# Patient Record
Sex: FEMALE | Race: WHITE | NOT HISPANIC OR LATINO
[De-identification: names, ages, dates, MRNs, and addresses within clinical notes are randomized per-mention and may not be internally consistent; named-entity substitution may affect disease eponyms.]

---

## 2017-01-10 ENCOUNTER — APPOINTMENT (OUTPATIENT)
Dept: DERMATOLOGY | Facility: CLINIC | Age: 42
End: 2017-01-10

## 2017-01-13 ENCOUNTER — APPOINTMENT (OUTPATIENT)
Dept: DERMATOLOGY | Facility: CLINIC | Age: 42
End: 2017-01-13

## 2017-06-22 ENCOUNTER — APPOINTMENT (OUTPATIENT)
Dept: DERMATOLOGY | Facility: CLINIC | Age: 42
End: 2017-06-22

## 2017-12-19 ENCOUNTER — APPOINTMENT (OUTPATIENT)
Dept: DERMATOLOGY | Facility: CLINIC | Age: 42
End: 2017-12-19

## 2018-06-21 ENCOUNTER — APPOINTMENT (OUTPATIENT)
Dept: DERMATOLOGY | Facility: CLINIC | Age: 43
End: 2018-06-21
Payer: COMMERCIAL

## 2018-06-21 PROCEDURE — 99213 OFFICE O/P EST LOW 20 MIN: CPT

## 2020-06-30 ENCOUNTER — APPOINTMENT (OUTPATIENT)
Dept: MAMMOGRAPHY | Facility: CLINIC | Age: 45
End: 2020-06-30
Payer: COMMERCIAL

## 2020-06-30 PROCEDURE — 77067 SCR MAMMO BI INCL CAD: CPT

## 2020-06-30 PROCEDURE — 77063 BREAST TOMOSYNTHESIS BI: CPT

## 2022-08-15 ENCOUNTER — APPOINTMENT (OUTPATIENT)
Dept: RHEUMATOLOGY | Facility: CLINIC | Age: 47
End: 2022-08-15

## 2022-08-15 ENCOUNTER — NON-APPOINTMENT (OUTPATIENT)
Age: 47
End: 2022-08-15

## 2022-08-15 ENCOUNTER — RESULT REVIEW (OUTPATIENT)
Age: 47
End: 2022-08-15

## 2022-08-15 VITALS
OXYGEN SATURATION: 97 % | SYSTOLIC BLOOD PRESSURE: 118 MMHG | BODY MASS INDEX: 29.07 KG/M2 | HEIGHT: 61 IN | DIASTOLIC BLOOD PRESSURE: 72 MMHG | WEIGHT: 154 LBS | TEMPERATURE: 97.6 F | HEART RATE: 77 BPM

## 2022-08-15 DIAGNOSIS — Z78.9 OTHER SPECIFIED HEALTH STATUS: ICD-10-CM

## 2022-08-15 DIAGNOSIS — Z87.891 PERSONAL HISTORY OF NICOTINE DEPENDENCE: ICD-10-CM

## 2022-08-15 DIAGNOSIS — Z82.69 FAMILY HISTORY OF OTHER DISEASES OF THE MUSCULOSKELETAL SYSTEM AND CONNECTIVE TISSUE: ICD-10-CM

## 2022-08-15 PROCEDURE — 99204 OFFICE O/P NEW MOD 45 MIN: CPT

## 2022-08-15 RX ORDER — PHENTERMINE HYDROCHLORIDE 37.5 MG/1
37.5 CAPSULE ORAL
Refills: 0 | Status: DISCONTINUED | COMMUNITY

## 2022-08-15 RX ORDER — TOPIRAMATE 50 MG/1
50 TABLET, COATED ORAL
Refills: 0 | Status: DISCONTINUED | COMMUNITY

## 2022-08-15 NOTE — PHYSICAL EXAM
[General Appearance - Alert] : alert [General Appearance - In No Acute Distress] : in no acute distress [General Appearance - Well Nourished] : well nourished [Sclera] : the sclera and conjunctiva were normal [PERRL With Normal Accommodation] : pupils were equal in size, round, and reactive to light [Extraocular Movements] : extraocular movements were intact [Outer Ear] : the ears and nose were normal in appearance [Nasal Cavity] : the nasal mucosa and septum were normal [Oropharynx] : the oropharynx was normal [Neck Appearance] : the appearance of the neck was normal [Auscultation Breath Sounds / Voice Sounds] : lungs were clear to auscultation bilaterally [Heart Sounds] : normal S1 and S2 [Heart Rate And Rhythm] : heart rate was normal and rhythm regular [Murmurs] : no murmurs [Edema] : there was no peripheral edema [Bowel Sounds] : normal bowel sounds [Abdomen Soft] : soft [Abdomen Tenderness] : non-tender [No CVA Tenderness] : no ~M costovertebral angle tenderness [No Spinal Tenderness] : no spinal tenderness [Abnormal Walk] : normal gait [Nail Clubbing] : no clubbing  or cyanosis of the fingernails [Musculoskeletal - Swelling] : no joint swelling seen [Motor Tone] : muscle strength and tone were normal [FreeTextEntry1] : No synovitis, mild crepitus in L knee tho ROM intact  [] : no rash [No Focal Deficits] : no focal deficits [Oriented To Time, Place, And Person] : oriented to person, place, and time [Impaired Insight] : insight and judgment were intact [Affect] : the affect was normal

## 2022-08-15 NOTE — HISTORY OF PRESENT ILLNESS
[FreeTextEntry1] : IMANI WU is a 47 year old woman who presents with + YAMIL 1:160 (H) checked in response to chronic rashes on cheeks, periorally with lip swelling, and more recently on ears/ neck x 1 year. Raised, painful, lasts up to 1-2 weeks, worsened with sun exposure, no other noticeable triggers, did start meds for weight loss but this was in 2020 and rash did not occur til 2021. When rash occurs, develops arthralgias in L knee and b/l hands without faustina synovitis, no other affected joints. Has to ice them, improves with Aleve. Rashes occur once monthly, joints are occurring sometimes in isolation. No symptoms today. \par \par + markedly hair thinning diffusely but no focal loss \par + some sun sensitivity \par + chronic fatigue but not worsening \par \par SLE ROS negative for alopecia, sicca, salivary gland swelling, oral ulcers, SOB, chest pain, serositis, abd pain, dysuria, hematuria, hematologic abnormalities, Raynauds. APLS ROS - 2 uncomplicated pregnancies, no miscarriage, no thrombotic events. \par \par Labs - as above \par Negative ESR/CRP, RF\par + FH of father with ? lupus, Graves -- no longer in contact with him

## 2022-08-15 NOTE — ASSESSMENT
[FreeTextEntry1] : IMANI WU is a 47 year old woman who presents with below -- \par \par # + YAMIL, rashes tho not in classic lupus pattern, and associated fixed arthralgias - ddx remains broad but certainly lupus is on it. \par - Discussed that YAMIL can be seen in 10-15% of normal population, + Ab incidence increases with age and with presence of other family members with hx of autoimmune dz or Ab positivity. Also discussed that YAMIL alone does not confer a diagnosis and that presently she does not meet full criteria for SLE however w/u is warranted given her sx\par - serologies as below\par - XR L knee \par - photo protective measures reviewed -- Importance of limiting exposure to sun, photo protective clothing, and use of high SPF sunscreen \par - reviewed other CTD sx, will monitor for any \par - if above negative, will likely refer to derm and see if ?allergic given she is actively getting allergy shots\par \par TEB in 2-3 weeks to review results

## 2022-08-16 ENCOUNTER — TRANSCRIPTION ENCOUNTER (OUTPATIENT)
Age: 47
End: 2022-08-16

## 2022-08-16 ENCOUNTER — APPOINTMENT (OUTPATIENT)
Dept: RADIOLOGY | Facility: CLINIC | Age: 47
End: 2022-08-16

## 2022-08-16 PROCEDURE — 73560 X-RAY EXAM OF KNEE 1 OR 2: CPT | Mod: LT

## 2022-08-17 LAB
ALBUMIN SERPL ELPH-MCNC: 4.2 G/DL
ALP BLD-CCNC: 67 U/L
ALT SERPL-CCNC: 8 U/L
ANION GAP SERPL CALC-SCNC: 12 MMOL/L
APPEARANCE: CLEAR
AST SERPL-CCNC: 13 U/L
BACTERIA: NEGATIVE
BASOPHILS # BLD AUTO: 0.07 K/UL
BASOPHILS NFR BLD AUTO: 1.1 %
BILIRUB SERPL-MCNC: 0.4 MG/DL
BILIRUBIN URINE: NEGATIVE
BLOOD URINE: ABNORMAL
BUN SERPL-MCNC: 12 MG/DL
C3 SERPL-MCNC: 114 MG/DL
C4 SERPL-MCNC: 24 MG/DL
CALCIUM SERPL-MCNC: 9.4 MG/DL
CCP AB SER IA-ACNC: <8 UNITS
CHLORIDE SERPL-SCNC: 100 MMOL/L
CO2 SERPL-SCNC: 25 MMOL/L
COLOR: COLORLESS
CREAT SERPL-MCNC: 0.81 MG/DL
CRP SERPL-MCNC: <3 MG/L
DSDNA AB SER-ACNC: <12 IU/ML
EGFR: 91 ML/MIN/1.73M2
EOSINOPHIL # BLD AUTO: 0.49 K/UL
EOSINOPHIL NFR BLD AUTO: 7.5 %
ERYTHROCYTE [SEDIMENTATION RATE] IN BLOOD BY WESTERGREN METHOD: 5 MM/HR
GLUCOSE QUALITATIVE U: NEGATIVE
GLUCOSE SERPL-MCNC: 73 MG/DL
HCT VFR BLD CALC: 40.9 %
HGB BLD-MCNC: 12.6 G/DL
HYALINE CASTS: 0 /LPF
IMM GRANULOCYTES NFR BLD AUTO: 0.5 %
KETONES URINE: NEGATIVE
LEUKOCYTE ESTERASE URINE: NEGATIVE
LYMPHOCYTES # BLD AUTO: 1.85 K/UL
LYMPHOCYTES NFR BLD AUTO: 28.5 %
MAN DIFF?: NORMAL
MCHC RBC-ENTMCNC: 28.7 PG
MCHC RBC-ENTMCNC: 30.8 GM/DL
MCV RBC AUTO: 93.2 FL
MICROSCOPIC-UA: NORMAL
MONOCYTES # BLD AUTO: 0.35 K/UL
MONOCYTES NFR BLD AUTO: 5.4 %
NEUTROPHILS # BLD AUTO: 3.71 K/UL
NEUTROPHILS NFR BLD AUTO: 57 %
NITRITE URINE: NEGATIVE
PH URINE: 7.5
PLATELET # BLD AUTO: 285 K/UL
POTASSIUM SERPL-SCNC: 4.3 MMOL/L
PROT SERPL-MCNC: 6.2 G/DL
PROTEIN URINE: NEGATIVE
RBC # BLD: 4.39 M/UL
RBC # FLD: 13.8 %
RED BLOOD CELLS URINE: 1 /HPF
RF+CCP IGG SER-IMP: NEGATIVE
SODIUM SERPL-SCNC: 137 MMOL/L
SPECIFIC GRAVITY URINE: 1
SQUAMOUS EPITHELIAL CELLS: 0 /HPF
THYROGLOB AB SERPL-ACNC: <20 IU/ML
THYROPEROXIDASE AB SERPL IA-ACNC: <10 IU/ML
UROBILINOGEN URINE: NORMAL
WBC # FLD AUTO: 6.5 K/UL
WHITE BLOOD CELLS URINE: 0 /HPF

## 2022-08-21 LAB
ACE BLD-CCNC: 54 U/L
ENA RNP AB SER IA-ACNC: 0.2 AL
ENA SM AB SER IA-ACNC: <0.2 AL
ENA SS-A AB SER IA-ACNC: <0.2 AL
ENA SS-B AB SER IA-ACNC: <0.2 AL
HISTONE AB SER QL: 0.4 UNITS

## 2022-08-30 ENCOUNTER — APPOINTMENT (OUTPATIENT)
Dept: RHEUMATOLOGY | Facility: CLINIC | Age: 47
End: 2022-08-30

## 2022-08-30 LAB
CHRONIC URTICARIA PANEL (CU INDEX): <5.8
G6PD SER-CCNC: 16.1 U/G HGB

## 2022-08-30 PROCEDURE — 99442: CPT

## 2022-08-30 NOTE — PHYSICAL EXAM
[General Appearance - Alert] : alert [General Appearance - In No Acute Distress] : in no acute distress [General Appearance - Well Nourished] : well nourished [Sclera] : the sclera and conjunctiva were normal [PERRL With Normal Accommodation] : pupils were equal in size, round, and reactive to light [Extraocular Movements] : extraocular movements were intact [Neck Appearance] : the appearance of the neck was normal [Auscultation Breath Sounds / Voice Sounds] : lungs were clear to auscultation bilaterally [Abnormal Walk] : normal gait [Nail Clubbing] : no clubbing  or cyanosis of the fingernails [Musculoskeletal - Swelling] : no joint swelling seen [Motor Tone] : muscle strength and tone were normal [] : no rash [Oriented To Time, Place, And Person] : oriented to person, place, and time [Impaired Insight] : insight and judgment were intact [Affect] : the affect was normal [FreeTextEntry1] : No synovitis, mild crepitus in L knee tho ROM intact

## 2022-09-09 NOTE — REASON FOR VISIT
[Patient] : the patient [Self] : self [Follow-Up: _____] : a [unfilled] follow-up visit [This encounter was initiated by telehealth (audio with video) and converted to telephone (audio only) due to technical difficulties.] : This encounter was initiated by telehealth (audio with video) and converted to telephone (audio only) due to technical difficulties. [Home] : at home, [unfilled] , at the time of the visit. [Medical Office: ([facility])___] : at the medical office located in  [Verbal consent obtained from patient] : the patient, [unfilled] [FreeTextEntry1] : + YAMIL, rash, joint pain

## 2022-09-09 NOTE — ASSESSMENT
[FreeTextEntry1] : IMANI WU is a 47 year old woman who presents with below -- \par \par # + YAMIL, rashes tho not in classic lupus pattern, and associated fixed arthralgias - serologic w/u negative to date including specific lupus serologies and sx have not worsened in interim. \par - Discussed that YAMIL can be seen in 10-15% of normal population, + Ab incidence increases with age and with presence of other family members with hx of autoimmune dz or Ab positivity. Also discussed that YAMIL alone does not confer a diagnosis and that presently she does not meet full criteria for SLE \par - labs and imaging reviewed with her today -- at present no overt rheum dx but there is a possibility for mild lupus with + YAMIL only vs a subacute cutaneous process with ?joints unrelated - recommend clinical surveillance for that. Advised prompt derm eval if rashes recur for possible bx as this will help with diagnosis. \par - can resume med for weight loss she has been holding, will assess to see if adding it back worsens symptoms or not. \par - c/w photo protective measure -- limiting exposure to sun, photo protective clothing, and use of high SPF sunscreen \par - reviewed other CTD sx, will monitor for any \par \par RTC in 6 months to re-eval, sooner if new/worsening sx \par \par Total time on telephone : 13 minutes

## 2022-09-09 NOTE — REVIEW OF SYSTEMS
[Feeling Tired] : feeling tired [As Noted in HPI] : as noted in HPI [Arthralgias] : arthralgias [Joint Pain] : joint pain [Negative] : Integumentary [Joint Swelling] : no joint swelling [Joint Stiffness] : no joint stiffness

## 2022-09-09 NOTE — HISTORY OF PRESENT ILLNESS
[FreeTextEntry1] : IMANI WU is a 47 year old woman who presents with + YAMIL 1:160 (H) checked in response to chronic rashes on cheeks, periorally with lip swelling, and more recently on ears/ neck x 1 year. Raised, painful, lasts up to 1-2 weeks, worsened with sun exposure, no other noticeable triggers, did start meds for weight loss but this was in 2020 and rash did not occur til 2021. When rash occurs, develops arthralgias in L knee and b/l hands without faustina synovitis, no other affected joints. Has to ice them, improves with Aleve. Rashes occur once monthly, joints are occurring sometimes in isolation. No symptoms today. \par \par + markedly hair thinning diffusely but no focal loss \par + some sun sensitivity \par + chronic fatigue but not worsening \par \par SLE ROS negative for alopecia, sicca, salivary gland swelling, oral ulcers, SOB, chest pain, serositis, abd pain, dysuria, hematuria, hematologic abnormalities, Raynauds. APLS ROS - 2 uncomplicated pregnancies, no miscarriage, no thrombotic events. \par \par Labs - as above \par Negative ESR/CRP, RF/CCP, ACE, thyroid Abs, lupus specifics, C3/4, CU index \par + FH of father with ? lupus, Graves -- no longer in contact with him \par \par ----------\par 8/30/22 -- No further rashes or worsening arthralgias since last visit, had 1 episode of knee pain, resolved after 4 hours s/p Aleve. Fatigue stable. No other CTD sx in interim. Feeling well today.

## 2023-02-27 ENCOUNTER — APPOINTMENT (OUTPATIENT)
Dept: RHEUMATOLOGY | Facility: CLINIC | Age: 48
End: 2023-02-27

## 2023-04-17 ENCOUNTER — APPOINTMENT (OUTPATIENT)
Dept: RHEUMATOLOGY | Facility: CLINIC | Age: 48
End: 2023-04-17
Payer: COMMERCIAL

## 2023-04-17 VITALS
OXYGEN SATURATION: 100 % | HEIGHT: 61 IN | SYSTOLIC BLOOD PRESSURE: 95 MMHG | BODY MASS INDEX: 26.24 KG/M2 | TEMPERATURE: 97.7 F | DIASTOLIC BLOOD PRESSURE: 66 MMHG | WEIGHT: 139 LBS | HEART RATE: 88 BPM

## 2023-04-17 DIAGNOSIS — R21 RASH AND OTHER NONSPECIFIC SKIN ERUPTION: ICD-10-CM

## 2023-04-17 PROCEDURE — 99214 OFFICE O/P EST MOD 30 MIN: CPT

## 2023-04-17 RX ORDER — TRIAMCINOLONE ACETONIDE 1 MG/G
0.1 CREAM TOPICAL
Qty: 15 | Refills: 0 | Status: DISCONTINUED | COMMUNITY
Start: 2022-03-28 | End: 2023-04-17

## 2023-04-17 RX ORDER — PHENTERMINE HYDROCHLORIDE 37.5 MG/1
37.5 TABLET ORAL
Qty: 30 | Refills: 0 | Status: DISCONTINUED | COMMUNITY
Start: 2022-06-15 | End: 2023-04-17

## 2023-04-17 RX ORDER — TIRZEPATIDE 5 MG/.5ML
5 INJECTION, SOLUTION SUBCUTANEOUS
Refills: 0 | Status: ACTIVE | COMMUNITY

## 2023-04-17 RX ORDER — TOPIRAMATE 50 MG/1
50 TABLET, FILM COATED ORAL
Qty: 90 | Refills: 0 | Status: DISCONTINUED | COMMUNITY
Start: 2022-06-15 | End: 2023-04-17

## 2023-06-21 ENCOUNTER — APPOINTMENT (OUTPATIENT)
Dept: ULTRASOUND IMAGING | Facility: CLINIC | Age: 48
End: 2023-06-21
Payer: COMMERCIAL

## 2023-06-21 ENCOUNTER — APPOINTMENT (OUTPATIENT)
Dept: MAMMOGRAPHY | Facility: CLINIC | Age: 48
End: 2023-06-21
Payer: COMMERCIAL

## 2023-06-21 PROCEDURE — 77067 SCR MAMMO BI INCL CAD: CPT

## 2023-06-21 PROCEDURE — 76536 US EXAM OF HEAD AND NECK: CPT

## 2023-06-21 PROCEDURE — 77063 BREAST TOMOSYNTHESIS BI: CPT

## 2023-07-05 NOTE — ASSESSMENT
[FreeTextEntry1] : IMANI WU is a 48 year old woman with below -- \par \par # + YAMIL, rashes tho not in classic lupus pattern, and associated fixed arthralgias - serologic w/u negative to date including specific lupus serologies and sx have not worsened in interim. \par - Discussed that YAMIL can be seen in 10-15% of normal population, + Ab incidence increases with age and with presence of other family members with hx of autoimmune dz or Ab positivity. Also discussed that YAMIL alone does not confer a diagnosis and that presently she does not meet full criteria for SLE \par - labs and imaging reviewed with her today -- at present no overt rheum dx but there is a possibility for mild lupus with + YAMIL only vs a subacute cutaneous process with ?joints unrelated - c/w clinical surveillance, will repeat labs, prompt derm eval if rashes recur for possible bx as this will help with diagnosis.  \par - c/w photo protective measure -- limiting exposure to sun, photo protective clothing, and use of high SPF sunscreen \par - reviewed other CTD sx, will monitor for any \par \par RTC in 4 months to re-eval, sooner if new/worsening sx

## 2023-07-05 NOTE — HISTORY OF PRESENT ILLNESS
[FreeTextEntry1] : IMANI WU is a 47 year old woman who presents with + YAMIL 1:160 (H) checked in response to chronic rashes on cheeks, periorally with lip swelling, and more recently on ears/ neck x 1 year. Raised, painful, lasts up to 1-2 weeks, worsened with sun exposure, no other noticeable triggers, did start meds for weight loss but this was in 2020 and rash did not occur til 2021. When rash occurs, develops arthralgias in L knee and b/l hands without faustina synovitis, no other affected joints. Has to ice them, improves with Aleve. Rashes occur once monthly, joints are occurring sometimes in isolation. No symptoms today. \par \par + markedly hair thinning diffusely but no focal loss \par + some sun sensitivity \par + chronic fatigue but not worsening \par \par SLE ROS negative for alopecia, sicca, salivary gland swelling, oral ulcers, SOB, chest pain, serositis, abd pain, dysuria, hematuria, hematologic abnormalities, Raynauds. APLS ROS - 2 uncomplicated pregnancies, no miscarriage, no thrombotic events. \par \par Labs - as above \par Negative ESR/CRP, RF/CCP, ACE, thyroid Abs, lupus specifics, C3/4, CU index \par + FH of father with ? lupus, Graves -- no longer in contact with him \par \par ----------\par 8/30/22 -- No further rashes or worsening arthralgias since last visit, had 1 episode of knee pain, resolved after 4 hours s/p Aleve. Fatigue stable. No other CTD sx in interim. Feeling well today. \par \par 4/17/23 -- Few minor episodes of rash but markedly less severe than prior, no current joint inflammatory sx. Not needing Aleve. Fatigue and mild GI sx chronic and stable. Some episodes of feeling like throat is closing up but not with food, moreso when speaking, improved with drinking water. Remainder of CTD ROS negative.

## 2023-07-05 NOTE — REVIEW OF SYSTEMS
[Feeling Tired] : feeling tired [Arthralgias] : arthralgias [Negative] : Heme/Lymph [Abdominal Pain] : abdominal pain [Joint Swelling] : no joint swelling [Joint Stiffness] : no joint stiffness [As Noted in HPI] : as noted in HPI

## 2023-07-05 NOTE — PHYSICAL EXAM
[General Appearance - Alert] : alert [General Appearance - In No Acute Distress] : in no acute distress [General Appearance - Well Nourished] : well nourished [Sclera] : the sclera and conjunctiva were normal [PERRL With Normal Accommodation] : pupils were equal in size, round, and reactive to light [Extraocular Movements] : extraocular movements were intact [Outer Ear] : the ears and nose were normal in appearance [Nasal Cavity] : the nasal mucosa and septum were normal [Oropharynx] : the oropharynx was normal [Neck Appearance] : the appearance of the neck was normal [Auscultation Breath Sounds / Voice Sounds] : lungs were clear to auscultation bilaterally [Heart Rate And Rhythm] : heart rate was normal and rhythm regular [Heart Sounds] : normal S1 and S2 [Murmurs] : no murmurs [Edema] : there was no peripheral edema [Bowel Sounds] : normal bowel sounds [Abdomen Soft] : soft [Abdomen Tenderness] : non-tender [No CVA Tenderness] : no ~M costovertebral angle tenderness [No Spinal Tenderness] : no spinal tenderness [Abnormal Walk] : normal gait [Nail Clubbing] : no clubbing  or cyanosis of the fingernails [Musculoskeletal - Swelling] : no joint swelling seen [Motor Tone] : muscle strength and tone were normal [] : no rash [No Focal Deficits] : no focal deficits [Oriented To Time, Place, And Person] : oriented to person, place, and time [Impaired Insight] : insight and judgment were intact [Affect] : the affect was normal [Neck Cervical Mass (___cm)] : no neck mass was observed [FreeTextEntry1] : No synovitis, mild crepitus in L knee tho ROM intact

## 2023-08-14 ENCOUNTER — APPOINTMENT (OUTPATIENT)
Dept: RHEUMATOLOGY | Facility: CLINIC | Age: 48
End: 2023-08-14
Payer: COMMERCIAL

## 2023-08-14 VITALS
OXYGEN SATURATION: 98 % | HEART RATE: 75 BPM | WEIGHT: 132 LBS | DIASTOLIC BLOOD PRESSURE: 85 MMHG | SYSTOLIC BLOOD PRESSURE: 121 MMHG | TEMPERATURE: 97.3 F | HEIGHT: 61 IN | BODY MASS INDEX: 24.92 KG/M2

## 2023-08-14 DIAGNOSIS — M62.838 OTHER MUSCLE SPASM: ICD-10-CM

## 2023-08-14 DIAGNOSIS — R76.8 OTHER SPECIFIED ABNORMAL IMMUNOLOGICAL FINDINGS IN SERUM: ICD-10-CM

## 2023-08-14 DIAGNOSIS — M25.50 PAIN IN UNSPECIFIED JOINT: ICD-10-CM

## 2023-08-14 PROCEDURE — 99214 OFFICE O/P EST MOD 30 MIN: CPT

## 2023-08-14 NOTE — PHYSICAL EXAM
[General Appearance - In No Acute Distress] : in no acute distress [General Appearance - Alert] : alert [General Appearance - Well Nourished] : well nourished [Sclera] : the sclera and conjunctiva were normal [PERRL With Normal Accommodation] : pupils were equal in size, round, and reactive to light [Extraocular Movements] : extraocular movements were intact [Outer Ear] : the ears and nose were normal in appearance [Nasal Cavity] : the nasal mucosa and septum were normal [Oropharynx] : the oropharynx was normal [Neck Appearance] : the appearance of the neck was normal [Neck Cervical Mass (___cm)] : no neck mass was observed [Auscultation Breath Sounds / Voice Sounds] : lungs were clear to auscultation bilaterally [Heart Rate And Rhythm] : heart rate was normal and rhythm regular [Heart Sounds] : normal S1 and S2 [Murmurs] : no murmurs [Edema] : there was no peripheral edema [No CVA Tenderness] : no ~M costovertebral angle tenderness [No Spinal Tenderness] : no spinal tenderness [Abnormal Walk] : normal gait [Nail Clubbing] : no clubbing  or cyanosis of the fingernails [Musculoskeletal - Swelling] : no joint swelling seen [Motor Tone] : muscle strength and tone were normal [] : no rash [No Focal Deficits] : no focal deficits [Oriented To Time, Place, And Person] : oriented to person, place, and time [Impaired Insight] : insight and judgment were intact [Affect] : the affect was normal [FreeTextEntry1] : No synovitis, mild crepitus in L knee tho ROM intact, L shoulder ROM intact, + mild trap spasm

## 2023-08-14 NOTE — ASSESSMENT
[FreeTextEntry1] : IMANI WU is a 48 year old woman with below --   # + YAMIL, rashes tho not in classic lupus pattern, and associated fixed arthralgias - serologic w/u negative to date including specific lupus serologies, repeat labs without lupus activity, initial sx have resolved.  - Discussed that YAMIL can be seen in 10-15% of normal population, + Ab incidence increases with age and with presence of other family members with hx of autoimmune dz or Ab positivity. Also discussed that YAMIL alone does not confer a diagnosis and that presently she does not meet full criteria for SLE  - labs and imaging reviewed with her today -- remain negative -- no further w/u at present, reviewed CTD sx to monitor for and return promptly if develops any, prompt derm eval if rashes recur  - c/w photo protective measure -- limiting exposure to sun, photo protective clothing, and use of high SPF sunscreen   # L sided trap spasm - advised stretching as minimally affecting her, reviewed warning signs of worsening LUE paresthesia/weakness and recommended prompt PMD eval if these develop   RTC prn

## 2023-08-14 NOTE — REVIEW OF SYSTEMS
[As Noted in HPI] : as noted in HPI [Arthralgias] : arthralgias [Negative] : Integumentary [Joint Swelling] : no joint swelling [Joint Stiffness] : no joint stiffness